# Patient Record
Sex: MALE | Race: BLACK OR AFRICAN AMERICAN | Employment: UNEMPLOYED | URBAN - METROPOLITAN AREA
[De-identification: names, ages, dates, MRNs, and addresses within clinical notes are randomized per-mention and may not be internally consistent; named-entity substitution may affect disease eponyms.]

---

## 2018-08-02 ENCOUNTER — OFFICE VISIT (OUTPATIENT)
Dept: URGENT CARE | Facility: URGENT CARE | Age: 43
End: 2018-08-02

## 2018-08-02 VITALS
OXYGEN SATURATION: 96 % | DIASTOLIC BLOOD PRESSURE: 89 MMHG | HEART RATE: 88 BPM | SYSTOLIC BLOOD PRESSURE: 157 MMHG | TEMPERATURE: 98.2 F

## 2018-08-02 DIAGNOSIS — L08.9 LACERATION OF LOWER LEG WITH INFECTION, LEFT, INITIAL ENCOUNTER: Primary | ICD-10-CM

## 2018-08-02 DIAGNOSIS — S81.812A LACERATION OF LOWER LEG WITH INFECTION, LEFT, INITIAL ENCOUNTER: Primary | ICD-10-CM

## 2018-08-02 PROCEDURE — 99203 OFFICE O/P NEW LOW 30 MIN: CPT | Performed by: NURSE PRACTITIONER

## 2018-08-02 RX ORDER — OXYCODONE AND ACETAMINOPHEN 5; 325 MG/1; MG/1
1 TABLET ORAL
COMMUNITY

## 2018-08-02 RX ORDER — IBUPROFEN 800 MG/1
TABLET, FILM COATED ORAL
Refills: 0 | COMMUNITY
Start: 2018-07-23

## 2018-08-02 RX ORDER — HYDROCODONE BITARTRATE AND ACETAMINOPHEN 5; 325 MG/1; MG/1
TABLET ORAL
Refills: 0 | COMMUNITY
Start: 2018-07-23

## 2018-08-02 RX ORDER — CLINDAMYCIN HCL 300 MG
300 CAPSULE ORAL 4 TIMES DAILY
Qty: 40 CAPSULE | Refills: 0 | Status: SHIPPED | OUTPATIENT
Start: 2018-08-02 | End: 2018-08-12

## 2018-08-02 RX ORDER — CEPHALEXIN 500 MG/1
CAPSULE ORAL
Refills: 0 | COMMUNITY
Start: 2018-07-24

## 2018-08-02 RX ORDER — HYDROCODONE BITARTRATE AND ACETAMINOPHEN 5; 325 MG/1; MG/1
1 TABLET ORAL EVERY 6 HOURS PRN
Qty: 12 TABLET | Refills: 0 | Status: SHIPPED | OUTPATIENT
Start: 2018-08-02 | End: 2018-08-05

## 2018-08-02 ASSESSMENT — ENCOUNTER SYMPTOMS
FEVER: 0
DIARRHEA: 0
DIAPHORESIS: 0
ROS SKIN COMMENTS: LEFT LOWER LEG WOUND
COUGH: 0
NAUSEA: 0
RHINORRHEA: 0
SORE THROAT: 0
CHILLS: 0
SHORTNESS OF BREATH: 0
VOMITING: 0

## 2018-08-02 NOTE — MR AVS SNAPSHOT
After Visit Summary   8/2/2018    John Potter    MRN: [id number]           Patient Information     Date Of Birth          1975        Visit Information        Provider Department      8/2/2018 6:50 PM Jahaira Ricardo NP [facility]        Today's Diagnoses     Laceration of lower leg with infection, left, initial encounter    -  1      Care Instructions      Infected Laceration, Not Stitched  A laceration is a cut through the skin. The cut has become infected. Because of the infection, and the amount of time that has passed since injury, the wound cannot be closed. It will heal best if left open and cleaned daily. It will seal over by growing new tissue from the sides and the bottom of the wound. Antibiotics may be prescribed. You will probably have a scar after it has healed.   Oral antibiotic medicine may be prescribed to treat the infection.  Home care    If antibiotics have been prescribed, take them exactly as directed. Don't t stop taking them until they are gone or you are told to stop, even if you feel better.     Follow the healthcare provider s instructions on how to care for the cut.    Unless otherwise instructed, change the bandage twice a day for the first few days, until the drainage stops. Then change it once a day. Change the bandage if it becomes wet, stained with wound fluid, or dirty.    Clean the wound daily:  ? After removing the bandage, gently wash the area with soap and water. Use a wet cotton swab to loosen and remove any blood or crust that forms.  ? After cleaning, apply a thin layer of over-the-counter antibiotic ointment if advised. Reapply a fresh bandage.    Follow the healthcare provider's instructions for keeping the wound dry. You may be given restrictions on showering or tub baths.    If the bandage gets wet, remove it. Gently pat the wound dry with a clean cloth, then replace the wet bandage with a dry one.    Don't scratch, rub, or pick at  the area.    Wash your hands with soap and warm water before and after cleaning the wound or changing the bandage.  Follow-up care  Follow up with your healthcare provider, or as advised. It is important to follow up to make sure the infection is getting better.  When to seek medical advice  Call your healthcare provider right away if any of these occur:    Symptoms don't begin to improve or get worse    Red streaks spread from the wound    Increase in pus coming from the wound    Increase in pain    Fever of 100.4 F (38 C) or higher, or as directed by your healthcare provider  Date Last Reviewed: 7/1/2017 2000-2017 [employer]. [address] [place] [address]. All rights reserved. This information is not intended as a substitute for professional medical care. Always follow your healthcare professional's instructions.                Follow-ups after your visit        Who to contact     If you have questions or need follow up information about today's clinic visit or your schedule please contact [facility] directly at [phone].  Normal or non-critical lab and imaging results will be communicated to you by MyChart, letter or phone within 4 business days after the clinic has received the results. If you do not hear from us within 7 days, please contact the clinic through MyChart or phone. If you have a critical or abnormal lab result, we will notify you by phone as soon as possible.  Submit refill requests through [employer] or call your pharmacy and they will forward the refill request to us. Please allow 3 business days for your refill to be completed.          Additional Information About Your Visit        Care EveryWhere ID     This is your Care EveryWhere ID. This could be used by other organizations to access your [facility] medical records  [id number]        Your Vitals Were     Pulse Temperature Pulse Oximetry             88 98.2  F (36.8  C) (Oral) 96%           Blood Pressure from Last 3 Encounters:   08/02/18 157/89    Weight from Last 3 Encounters:   No data found for Wt              Today, you had the following     No orders found for display         Today's Medication Changes          These changes are accurate as of 8/2/18  7:22 PM.  If you have any questions, ask your nurse or doctor.               Start taking these medicines.        Dose/Directions    clindamycin 300 MG capsule   Commonly known as:  CLEOCIN   Used for:  Laceration of lower leg with infection, left, initial encounter   Started by:  Jahaira Ricardo NP        Dose:  300 mg   Take 1 capsule (300 mg) by mouth 4 times daily for 10 days   Quantity:  40 capsule   Refills:  0         These medicines have changed or have updated prescriptions.        Dose/Directions    * HYDROcodone-acetaminophen 5-325 MG per tablet   Commonly known as:  NORCO   This may have changed:  Another medication with the same name was added. Make sure you understand how and when to take each.   Changed by:  Jahaira Ricardo NP        TAKE ONE TABLET BY MOUTH EVERY 4 HOURS PRN.   Refills:  0       * HYDROcodone-acetaminophen 5-325 MG per tablet   Commonly known as:  NORCO   This may have changed:  You were already taking a medication with the same name, and this prescription was added. Make sure you understand how and when to take each.   Used for:  Laceration of lower leg with infection, left, initial encounter   Changed by:  Jahaira Ricardo NP        Dose:  1 tablet   Take 1 tablet by mouth every 6 hours as needed for severe pain   Quantity:  12 tablet   Refills:  0       * Notice:  This list has 2 medication(s) that are the same as other medications prescribed for you. Read the directions carefully, and ask your doctor or other care provider to review them with you.         Where to get your medicines      These medications were sent to [facility]/pharmacy #[id number] - [facility], [place] - [address]  [address] [facility]  [address]     Phone:  [phone]     clindamycin 300 MG capsule         Some of these will need a paper prescription and others can be bought over the counter.  Ask your nurse if you have questions.     Bring a paper prescription for each of these medications     HYDROcodone-acetaminophen 5-325 MG per tablet               Information about OPIOIDS     PRESCRIPTION OPIOIDS: WHAT YOU NEED TO KNOW   We gave you an opioid (narcotic) pain medicine. It is important to manage your pain, but opioids are not always the best choice. You should first try all the other options your care team gave you. Take this medicine for as short a time (and as few doses) as possible.     These medicines have risks:    DO NOT drive when on new or higher doses of pain medicine. These medicines can affect your alertness and reaction times, and you could be arrested for driving under the influence (DUI). If you need to use opioids long-term, talk to your care team about driving.    DO NOT operate heave machinery    DO NOT do any other dangerous activities while taking these medicines.     DO NOT drink any alcohol while taking these medicines.      If the opioid prescribed includes acetaminophen, DO NOT take with any other medicines that contain acetaminophen. Read all labels carefully. Look for the word  acetaminophen  or  Tylenol.  Ask your pharmacist if you have questions or are unsure.    You can get addicted to pain medicines, especially if you have a history of addiction (chemical, alcohol or substance dependence). Talk to your care team about ways to reduce this risk.    Store your pills in a secure place, locked if possible. We will not replace any lost or stolen medicine. If you don t finish your medicine, please throw away (dispose) as directed by your pharmacist. The [place] Pollution [employer] Agency has more information about safe disposal: https://www.pca.[facility].[place].us/living-green/managing-unwanted-medications.     All opioids tend to  cause constipation. Drink plenty of water and eat foods that have a lot of fiber, such as fruits, vegetables, prune juice, apple juice and high-fiber cereal. Take a laxative (Miralax, milk of magnesia, Colace, Senna) if you don t move your bowels at least every other day.          Primary Care Provider Fax #    Physician No Ref-Primary [phone]       No address on file        Equal Access to Services     NICK HARPER : Hadii aad ku hadasho Soomaali, waaxda luqadaha, qaybta kaalmada adeegyada, waxay miranda alfredoeduard post marija laImanidivine . So [facility] [phone].    ATENCIÓN: Si habla espantonella, tiene a reece disposición servicios gratuitos de asistencia lingüística. Llame al [phone].    We comply with applicable federal civil rights laws and [place] laws. We do not discriminate on the basis of race, color, national origin, age, disability, sex, sexual orientation, or gender identity.            Thank you!     Thank you for choosing [facility]  for your care. Our goal is always to provide you with excellent care. Hearing back from our patients is one way we can continue to improve our services. Please take a few minutes to complete the written survey that you may receive in the mail after your visit with us. Thank you!             Your Updated Medication List - Protect others around you: Learn how to safely use, store and throw away your medicines at www.disposemymeds.org.          This list is accurate as of 8/2/18  7:22 PM.  Always use your most recent med list.                   Brand Name Dispense Instructions for use Diagnosis    cephALEXin 500 MG capsule    KEFLEX          clindamycin 300 MG capsule    CLEOCIN    40 capsule    Take 1 capsule (300 mg) by mouth 4 times daily for 10 days    Laceration of lower leg with infection, left, initial encounter       * HYDROcodone-acetaminophen 5-325 MG per tablet    NORCO     TAKE ONE TABLET BY MOUTH EVERY 4 HOURS PRN.        *  HYDROcodone-acetaminophen 5-325 MG per tablet    NORCO    12 tablet    Take 1 tablet by mouth every 6 hours as needed for severe pain    Laceration of lower leg with infection, left, initial encounter       ibuprofen 800 MG tablet    ADVIL/MOTRIN          oxyCODONE-acetaminophen 5-325 MG per tablet    PERCOCET     Take 1 tablet by mouth        * Notice:  This list has 2 medication(s) that are the same as other medications prescribed for you. Read the directions carefully, and ask your doctor or other care provider to review them with you.

## 2018-08-03 NOTE — PATIENT INSTRUCTIONS
Infected Laceration, Not Stitched  A laceration is a cut through the skin. The cut has become infected. Because of the infection, and the amount of time that has passed since injury, the wound cannot be closed. It will heal best if left open and cleaned daily. It will seal over by growing new tissue from the sides and the bottom of the wound. Antibiotics may be prescribed. You will probably have a scar after it has healed.   Oral antibiotic medicine may be prescribed to treat the infection.  Home care    If antibiotics have been prescribed, take them exactly as directed. Don't t stop taking them until they are gone or you are told to stop, even if you feel better.     Follow the healthcare provider s instructions on how to care for the cut.    Unless otherwise instructed, change the bandage twice a day for the first few days, until the drainage stops. Then change it once a day. Change the bandage if it becomes wet, stained with wound fluid, or dirty.    Clean the wound daily:  ? After removing the bandage, gently wash the area with soap and water. Use a wet cotton swab to loosen and remove any blood or crust that forms.  ? After cleaning, apply a thin layer of over-the-counter antibiotic ointment if advised. Reapply a fresh bandage.    Follow the healthcare provider's instructions for keeping the wound dry. You may be given restrictions on showering or tub baths.    If the bandage gets wet, remove it. Gently pat the wound dry with a clean cloth, then replace the wet bandage with a dry one.    Don't scratch, rub, or pick at the area.    Wash your hands with soap and warm water before and after cleaning the wound or changing the bandage.  Follow-up care  Follow up with your healthcare provider, or as advised. It is important to follow up to make sure the infection is getting better.  When to seek medical advice  Call your healthcare provider right away if any of these occur:    Symptoms don't begin to improve or get  worse    Red streaks spread from the wound    Increase in pus coming from the wound    Increase in pain    Fever of 100.4 F (38 C) or higher, or as directed by your healthcare provider  Date Last Reviewed: 7/1/2017 2000-2017 [employer]. [address] [place] [address]. All rights reserved. This information is not intended as a substitute for professional medical care. Always follow your healthcare professional's instructions.
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
## 2022-07-05 ENCOUNTER — TELEPHONE (OUTPATIENT)
Dept: GASTROENTEROLOGY | Age: 47
End: 2022-07-05

## 2023-02-01 DIAGNOSIS — R10.33 PERIUMBILICAL ABDOMINAL PAIN: Primary | ICD-10-CM

## 2023-02-01 DIAGNOSIS — R19.05 PERIUMBILICAL MASS: ICD-10-CM

## 2023-10-27 ENCOUNTER — IMAGING SERVICES (OUTPATIENT)
Dept: GENERAL RADIOLOGY | Age: 48
End: 2023-10-27
Attending: FAMILY MEDICINE

## 2023-10-27 ENCOUNTER — OCC HEALTH (OUTPATIENT)
Dept: OCCUPATIONAL MEDICINE | Age: 48
End: 2023-10-27

## 2023-10-27 VITALS
BODY MASS INDEX: 26.06 KG/M2 | HEART RATE: 72 BPM | WEIGHT: 166 LBS | HEIGHT: 67 IN | DIASTOLIC BLOOD PRESSURE: 88 MMHG | SYSTOLIC BLOOD PRESSURE: 128 MMHG

## 2023-10-27 DIAGNOSIS — Z02.89 VISIT FOR OCCUPATIONAL HEALTH EXAMINATION: Primary | ICD-10-CM

## 2023-10-27 DIAGNOSIS — Z02.89 VISIT FOR OCCUPATIONAL HEALTH EXAMINATION: ICD-10-CM

## 2023-10-27 DIAGNOSIS — Z02.89 ENCOUNTER FOR OCCUPATIONAL HEALTH EXAMINATION: ICD-10-CM

## 2023-10-27 PROCEDURE — OH071 RESPIRATORY (PFT) QUESTIONNAIRE: Performed by: FAMILY MEDICINE

## 2023-10-27 PROCEDURE — 71045 X-RAY EXAM CHEST 1 VIEW: CPT | Performed by: RADIOLOGY

## 2023-10-27 PROCEDURE — OH021 PRE PLACEMENT PHYSICAL EXAM: Performed by: FAMILY MEDICINE

## 2023-10-27 PROCEDURE — OH072 ASBESTOS QUESTIONAIRE: Performed by: FAMILY MEDICINE

## 2023-10-27 PROCEDURE — 94010 BREATHING CAPACITY TEST: CPT | Performed by: FAMILY MEDICINE

## 2023-10-27 PROCEDURE — OH075 XRAY B READING: Performed by: RADIOLOGY

## 2024-09-27 ENCOUNTER — APPOINTMENT (OUTPATIENT)
Dept: OCCUPATIONAL MEDICINE | Age: 49
End: 2024-09-27

## 2025-09-11 ENCOUNTER — APPOINTMENT (OUTPATIENT)
Dept: OCCUPATIONAL MEDICINE | Age: 50
End: 2025-09-11